# Patient Record
Sex: FEMALE | Race: WHITE | NOT HISPANIC OR LATINO | Employment: FULL TIME | URBAN - METROPOLITAN AREA
[De-identification: names, ages, dates, MRNs, and addresses within clinical notes are randomized per-mention and may not be internally consistent; named-entity substitution may affect disease eponyms.]

---

## 2022-11-29 ENCOUNTER — OFFICE VISIT (OUTPATIENT)
Dept: NEUROLOGY | Facility: CLINIC | Age: 62
End: 2022-11-29

## 2022-11-29 VITALS
SYSTOLIC BLOOD PRESSURE: 136 MMHG | HEART RATE: 81 BPM | DIASTOLIC BLOOD PRESSURE: 80 MMHG | OXYGEN SATURATION: 97 % | BODY MASS INDEX: 37.63 KG/M2 | WEIGHT: 268.8 LBS | HEIGHT: 71 IN

## 2022-11-29 DIAGNOSIS — R41.89 BRAIN FOG: Primary | ICD-10-CM

## 2022-11-29 PROCEDURE — 99204 OFFICE O/P NEW MOD 45 MIN: CPT | Performed by: PSYCHIATRY & NEUROLOGY

## 2022-11-29 NOTE — PROGRESS NOTES
"Notes by MA:  Patient presents to day for \"brain fog\" since having COVID around March of 2021.      Subjective:   62-year-old woman complaining of memory loss and brain fog since COVID infection in September 2020.  This is because difficulty at work and with projects outside of work.  She feels like she is lost her focus and to some degree her initiative.  She has tinnitus that she had previously but feels like it worsened after COVID.  She says that her hair thinned quite a bit after COVID but is started to come back slowly.  She says she snores.  However, she reports feeling rested in the mornings.  Patient ID: Chetna Good is a 62 y.o. female.    History of Present Illness  The following portions of the patient's history were reviewed and updated as appropriate: allergies, current medications, past family history, past medical history, past social history, past surgical history and problem list.    Review of Systems   Constitutional: Positive for appetite change (increased). Negative for activity change and fatigue.   HENT: Positive for tinnitus. Negative for facial swelling, trouble swallowing and voice change.    Eyes: Negative for photophobia, pain and visual disturbance.   Respiratory: Negative for chest tightness, shortness of breath and wheezing.    Cardiovascular: Negative for chest pain, palpitations and leg swelling.   Gastrointestinal: Negative for abdominal pain, nausea and vomiting.   Endocrine: Negative for cold intolerance and heat intolerance.   Musculoskeletal: Positive for joint swelling, neck pain and neck stiffness. Negative for arthralgias, back pain, gait problem and myalgias.   Neurological: Positive for numbness (toes). Negative for dizziness, tremors, seizures, syncope, facial asymmetry, speech difficulty, weakness, light-headedness and headaches.   Hematological: Does not bruise/bleed easily.   Psychiatric/Behavioral: Positive for decreased concentration. Negative for agitation, " behavioral problems, confusion, dysphoric mood, hallucinations, self-injury, sleep disturbance and suicidal ideas. The patient is not nervous/anxious and is not hyperactive.         Objective:    Neurologic Exam  Awake alert pleasant cooperative with fluent speech and normal speech comprehension.  Multiple cognitive screens were performed.  Please see accompanying data.  Her Folstein score was 27 with deficits primarily in recall.  Her animal fluency score was excellent at 28 and her clock drawing is normal at 4 out of 4.    Cranial nerves II through XII normal and symmetric.    The motor exam reveals normal and symmetric tone bulk and power with no drift and no spasticity.    Sensory exam reveals symmetric sensation to light touch, temperature, vibration.    Tendon reflexes are 1+ and symmetric throughout including ankle jerks.  Toes are downgoing.  No ankle clonus.    Walks in a narrow base with good stride length.  No parkinsonian features.  Physical Exam  Obese.  Neck is supple.  No cervical bruits.  Pulse is regular.  No extremity edema.  Assessment/Plan:     Diagnoses and all orders for this visit:    1. Brain fog (Primary)  -     Vitamin B12; Future  -     Folate; Future  -     Methylmalonic Acid, Serum; Future  -     TSH+Free T4  -     MRI Brain Without Contrast; Future     62-year-old woman with complaints of brain fog after COVID infection in September 2020.  Although there may have been a gradual improvement, she is still suffering from memory and cognitive issues that appear to be greater than expected for simple age-related memory loss.  Therefore we are moving ahead with work-up for reversible or modifiable causes to include metabolic work-up and brain imaging.  Per her request, we are arranging her brain MRI at [facility].  If the studies are unrevealing, it may be worthwhile to move on to a sleep study at that point given her body habitus and snoring, as another avenue to help with cognitive  dysfunction.  From a treatment standpoint, she has already been on a trial of steroids (for another reason) without seeing significant improvement.  Depending on her work-up a trial of Provigil or Nuvigil may be reasonable.  I discussed all the above with her in detail and answered her questions and I will review her work-up as it evolves.  Otherwise, we will see her back in 3 months time.

## 2022-12-02 ENCOUNTER — TELEPHONE (OUTPATIENT)
Dept: NEUROLOGY | Facility: CLINIC | Age: 62
End: 2022-12-02

## 2022-12-02 NOTE — TELEPHONE ENCOUNTER
Caller: BURKE     Best call back number:[phone]    What was the call regarding: I'M SO SORRY TO PUT THIS ON YOU ALL. I CALLED MY INS. YESTERDAY TO FIND OUT WHO HAS CONTRACT WITH MY INS SO I CAN SEE WHO OFFERS THE BEST COST/ DEDUCTIBLE FOR ([id number] MRI)  I WAS TOLD THEY CAN'T GIVE ME THAT INFORMATION, MY PROVIDER WOULD HAVE TO CALL. SO IF POSSIBLE CAN YOU LET ME KNOW WHAT  YOU FIND OUT.   ALSO I TOLD DR RUIZ IT WOULD PROBABLY BE IN JAN BEFORE I WOULD SCHED., THE ORDER FOR MRI  WAS ONLY GOOD UNTIL DEC. 30.22    IF THE COST IS  $2800.00 (MY DEDUCTIBLE)  I  CAN'T AFFORD THAT.     ALSO HE MENTION TWO DRUGS HE THOUGHT HE MAY SUGGEST FOR ME  DEPENDING RESULTS OF LABS ECT. CAN YOU SENT ME THE NAMES OF THEM SO I CAN LOOK UP.     Do you require a callback: YES IF YOU GET MY V.M YOU CAN LEAVE ME A DETAILED MESS. IM SORRY YOU HAVE TO DO THIS. BUT I HAVE EVEN TRIED   ON LINE AND GOT NOWHRE.     THANK YOU ALL FOR ALL YOUR HELP AGAIN HATE YOU HAVE TO DO INSURANCE  PART.

## 2022-12-09 ENCOUNTER — TELEPHONE (OUTPATIENT)
Dept: NEUROLOGY | Facility: CLINIC | Age: 62
End: 2022-12-09

## 2022-12-09 ENCOUNTER — PATIENT ROUNDING (BHMG ONLY) (OUTPATIENT)
Dept: NEUROLOGY | Facility: CLINIC | Age: 62
End: 2022-12-09

## 2022-12-09 ENCOUNTER — LAB (OUTPATIENT)
Dept: LAB | Facility: HOSPITAL | Age: 62
End: 2022-12-09

## 2022-12-09 DIAGNOSIS — R41.89 BRAIN FOG: ICD-10-CM

## 2022-12-09 LAB
FOLATE SERPL-MCNC: 19.3 NG/ML (ref 4.78–24.2)
T4 FREE SERPL-MCNC: 0.96 NG/DL (ref 0.93–1.7)
TSH SERPL DL<=0.05 MIU/L-ACNC: 2.45 UIU/ML (ref 0.27–4.2)
VIT B12 BLD-MCNC: 625 PG/ML (ref 211–946)

## 2022-12-09 PROCEDURE — 82746 ASSAY OF FOLIC ACID SERUM: CPT

## 2022-12-09 PROCEDURE — 36415 COLL VENOUS BLD VENIPUNCTURE: CPT

## 2022-12-09 PROCEDURE — 83921 ORGANIC ACID SINGLE QUANT: CPT

## 2022-12-09 PROCEDURE — 84439 ASSAY OF FREE THYROXINE: CPT | Performed by: PSYCHIATRY & NEUROLOGY

## 2022-12-09 PROCEDURE — 82607 VITAMIN B-12: CPT

## 2022-12-09 PROCEDURE — 84443 ASSAY THYROID STIM HORMONE: CPT | Performed by: PSYCHIATRY & NEUROLOGY

## 2022-12-09 NOTE — TELEPHONE ENCOUNTER
----- Message from Jayson Redman MD sent at 12/9/2022 10:00 AM EST -----  Vitamin B12 and thyroid panels look great.  No changes.

## 2022-12-13 ENCOUNTER — TELEPHONE (OUTPATIENT)
Dept: NEUROLOGY | Facility: CLINIC | Age: 62
End: 2022-12-13

## 2022-12-13 NOTE — TELEPHONE ENCOUNTER
MRI order has and prior authorization information, has been faxed over to [employer]. Also sent a [employer] message to make patient aware as well.

## 2022-12-13 NOTE — TELEPHONE ENCOUNTER
ZACH   I FOUND A MRI FACILITY  WAS TOLD SEVERAL OF OUR NEURO PT GO [facility]     [facility]   FAX # [phone]     WAS TOLD THEY CAN GET ME IN Monday IF THEY GET APPROVED ORDER .    THANKS BURKE

## 2022-12-15 LAB — METHYLMALONATE SERPL-SCNC: 160 NMOL/L (ref 0–378)

## 2023-01-10 ENCOUNTER — TELEPHONE (OUTPATIENT)
Dept: NEUROLOGY | Facility: CLINIC | Age: 63
End: 2023-01-10
Payer: COMMERCIAL

## 2023-01-10 NOTE — TELEPHONE ENCOUNTER
She would like to know the results from her MRI, and Dr Redman was going to go over some medications

## 2023-01-12 NOTE — TELEPHONE ENCOUNTER
Informed pt of MRI results. Pt verbalized understanding.     Per your note, if tests were unrevealing, may pursue sleep study and then add a medication, either provigil or nuvigil. Pt would like to proceed with a sleep study and medication.

## 2023-01-13 DIAGNOSIS — R41.89 BRAIN FOG: Primary | ICD-10-CM

## 2023-01-13 NOTE — TELEPHONE ENCOUNTER
Left detailed message, per request of pt.     Sleep study ordered - pt will need to request in home study    Medications cannot be ordered until after results of sleep study.    Call if any questions.

## 2023-03-10 ENCOUNTER — TELEPHONE (OUTPATIENT)
Dept: NEUROLOGY | Facility: CLINIC | Age: 63
End: 2023-03-10
Payer: COMMERCIAL

## 2023-03-10 NOTE — TELEPHONE ENCOUNTER
03/10/2023 Lm to call and reschedule her appointment on 06/09/2023 @ 8 am to another day, Dr Redman will be out of the office that week  LAB

## 2023-04-04 ENCOUNTER — TELEPHONE (OUTPATIENT)
Dept: NEUROLOGY | Facility: CLINIC | Age: 63
End: 2023-04-04
Payer: COMMERCIAL

## 2023-04-04 NOTE — TELEPHONE ENCOUNTER
04/04/2023 I spoke to Maury with [employer], he is going to send this back to be filed with the Insurance Co, I told him that I have already spoke to Tyra with [employer] and she says they will not retro any any claims, he says to give it 30 to 45 business days , he says patient should not be billed while this is being sent to the Insurance company, I called Chetna to give her this information, Billing # is [id number] for [employer]

## 2023-04-04 NOTE — TELEPHONE ENCOUNTER
2023 received a call from Chetna saying she received a bill for her MRI from [facility], I looked up the PA and The PA  on 2022, she says she has paperwork at home that says I had the PA extended to  and moved the facility to [facility] from [facility], She said she spoke to the facility and they told her that I Had to call the insurance Co. And have the PA amended so that the insurance co will pay for the MRI

## 2023-04-25 ENCOUNTER — OFFICE VISIT (OUTPATIENT)
Dept: SLEEP MEDICINE | Facility: HOSPITAL | Age: 63
End: 2023-04-25
Payer: COMMERCIAL

## 2023-04-25 ENCOUNTER — TELEPHONE (OUTPATIENT)
Dept: NEUROLOGY | Facility: CLINIC | Age: 63
End: 2023-04-25

## 2023-04-25 VITALS
HEIGHT: 71 IN | HEART RATE: 78 BPM | WEIGHT: 269.6 LBS | SYSTOLIC BLOOD PRESSURE: 178 MMHG | BODY MASS INDEX: 37.74 KG/M2 | DIASTOLIC BLOOD PRESSURE: 92 MMHG | OXYGEN SATURATION: 98 %

## 2023-04-25 DIAGNOSIS — G47.10 HYPERSOMNOLENCE: ICD-10-CM

## 2023-04-25 DIAGNOSIS — G47.33 OBSTRUCTIVE SLEEP APNEA: ICD-10-CM

## 2023-04-25 DIAGNOSIS — E66.9 OBESITY (BMI 30-39.9): ICD-10-CM

## 2023-04-25 DIAGNOSIS — R41.840 LACK OF CONCENTRATION: Primary | ICD-10-CM

## 2023-04-25 PROCEDURE — G0463 HOSPITAL OUTPT CLINIC VISIT: HCPCS

## 2023-04-25 NOTE — TELEPHONE ENCOUNTER
PT CALLED IN TO CANCEL APPT - WILL NEED TO BE R/S FOR AFTER SLEEP STUDY RESULTS ARE RECEIVED    THANK YOU

## 2023-04-25 NOTE — PROGRESS NOTES
"[facility] SLEEP MEDICINE  [address]  [address]  [place] [address] [phone]    Referring Physician: Dr. Rodriguez  PCP: Ehsan Bunch MD    Reason for consult:  Sleep disorders of brain fog after Covid    Chetna Good is a 62 y.o.female was seen in the Sleep Disorders Center today. She sleeps from 10pm to 6am. Recent wt gain 30 lbs. She wakes up feeling rested. Around 3 pm she tends to feel sleepy. Lives by herself.  Sylva Sleepiness Score: 5. Caffeine intake 1 per day. Alcohol intake 6 per week.    Chetna Good  has a past medical history of Bell palsy, Difficulty walking, Memory loss, and Shingles.     Current Medications:    Current Outpatient Medications:   •  azithromycin (ZITHROMAX) 250 MG tablet, 2 tablets first day, then 1 daily for 4 days, Disp: 6 tablet, Rfl: 0  •  MAGNESIUM PO, Take  by mouth., Disp: , Rfl:   •  predniSONE (DELTASONE) 20 MG tablet, Take 2 tablets by mouth Daily for 5 days., Disp: 10 tablet, Rfl: 0  •  VITAMIN D PO, Take  by mouth., Disp: , Rfl:    also listed in Sleep Questionnaire.    FH: family history is not on file.  SH:  reports that she has quit smoking. Her smoking use included cigarettes. She has never used smokeless tobacco. Alcohol use questions deferred to the physician. Drug use questions deferred to the physician.    Pertinent Positive Review of Systems of recurrent nose bleed, painful joints, swelling feet,depression  Rest of Review of Systems was negative as recorded in Sleep Questionnaire.        Vital Signs: /92   Pulse 78   Ht 180.3 cm (71\")   Wt 122 kg (269 lb 9.6 oz)   SpO2 98%   BMI 37.60 kg/m²     Body mass index is 37.6 kg/m².       Tongue: Large       Dentition: good       Pharynx: Posterior pharyngeal pillars are wide   Mallampatti: II (hard and soft palate, upper portion of tonsils anduvula visible)        General: Alert. Cooperative. Well developed. No acute distress.             Head:  Normocephalic. " Symmetrical. Atraumatic.              Eyes: Sclera clear. No icterus. PERRLA. Normal EOM.             Ears: No deformities. Normal hearing.             Nose: No septal deviation. No drainage.          Throat: No oral lesions. No thrush. Moist mucous membranes.    Chest Wall:  Normal shape. Symmetric expansion with respiration. No tenderness.             Neck:  Trachea midline.           Lungs:  Clear to auscultation bilaterally. No wheezes. No rhonchi. No rales. Respirations regular, even and unlabored.            Heart:  Regular rhythm and normal rate. Normal S1 and S2. No murmur.     Abdomen:  Soft, non-tender and non-distended. Normal bowel sounds. No masses.  Extremities:  Moves all extremities well. No edema.           Pulses: Pulses palpable and equal bilaterally.               Skin: Dry. Intact. No bleeding. No rash.           Neuro: Moves all 4 extremities and cranial nerves grossly intact.  Psychiatric: Normal mood and affect.    Impression:  No diagnosis found.      No orders of the defined types were placed in this encounter.           Plan:  Chetna reports brain fog and inability to concentrate as well as prior to COVID.  Multiple etiologies are possible including sleep disordered breathing.  Other etiologies such as nocturnal seizures, abnormal movements during sleep etc. would require in lab polysomnogram study.  I therefore offered her a in lab PSG.  She however prefers to start with a HST.  She is aware that this would only rule out sleep disordered breathing.  The same was scheduled.    This patient has typical features of obstructive sleep apnea with hypersomnolence snoring and apneas along with elevated BMI and classic oropharyngeal structure.  Likelihood of obstructive sleep apnea is high and a polysomnogram study will therefore be requested.      Possible diagnosis and pathophysiology of obstructive sleep apnea was discussed with the patient.  Health risks of untreated obstructive sleep apnea  including cardiovascular risks were discussed.  Patient was cautioned about activities requiring full concentration especially driving at night or for longer distances, and until hypersomnolence is corrected.    Results of sleep testing will be reviewed to see if patient is a candidate for CPAP machine.  Alternatives to therapy include oral mandibular advancing device (OMAD) were discussed. However OMAD is usually only beneficial in mild obstructive sleep apnea.  The benefit of weight loss in reducing severity of obstructive sleep apnea was discussed.  Patient would benefit from adhering to a strict diet to achieve ideal BMI.     Patient will follow up in this clinic after testing.    Thank you for allowing me to participate in your patient's care.    Electronically signed by Kushal Holland MD, 04/25/23, 8:26 AM EDT.    Part of this note may be an electronic transcription/translation of spoken language to printed text using the Dragon Dictation System.

## 2023-05-12 ENCOUNTER — HOSPITAL ENCOUNTER (OUTPATIENT)
Dept: SLEEP MEDICINE | Facility: HOSPITAL | Age: 63
Discharge: HOME | End: 2023-05-12
Admitting: INTERNAL MEDICINE
Payer: COMMERCIAL

## 2023-05-12 DIAGNOSIS — G47.33 OBSTRUCTIVE SLEEP APNEA: ICD-10-CM

## 2023-05-12 PROCEDURE — 95806 SLEEP STUDY UNATT&RESP EFFT: CPT

## 2023-06-01 ENCOUNTER — TELEPHONE (OUTPATIENT)
Dept: NEUROLOGY | Facility: CLINIC | Age: 63
End: 2023-06-01

## 2023-06-01 NOTE — TELEPHONE ENCOUNTER
Received call from pt calling on behalf of herself from the hub. Pt states that her sleep study results are in. She wants to know if she will hear the results from the sleep  or from Dr Redman and if he needs to see her for a follow up. She states that Dr Redman sent her for the sleep study. I told her that I wasn't really sure, but that she would probably hear from someone from our office when the results have been reviewed and Dr. Redman would decide if he needed to see her for a follow up visit. Pt states that she is unsure the best way to communicate with our office that she just figured out My Chart yesterday. I advised the pt that communicating through My Chart would be the best option.

## 2023-06-06 ENCOUNTER — TELEPHONE (OUTPATIENT)
Dept: SLEEP MEDICINE | Facility: HOSPITAL | Age: 63
End: 2023-06-06
Payer: COMMERCIAL

## 2023-06-06 NOTE — TELEPHONE ENCOUNTER
Went over results with pt. Sent message to  for orders. Pt would like orders sent to [facility]. Scheduled f/u appt to further discuss.

## 2023-06-16 ENCOUNTER — OFFICE VISIT (OUTPATIENT)
Dept: SLEEP MEDICINE | Facility: HOSPITAL | Age: 63
End: 2023-06-16
Payer: COMMERCIAL

## 2023-06-16 ENCOUNTER — TELEPHONE (OUTPATIENT)
Dept: SLEEP MEDICINE | Facility: HOSPITAL | Age: 63
End: 2023-06-16
Payer: COMMERCIAL

## 2023-06-16 VITALS
WEIGHT: 265 LBS | BODY MASS INDEX: 37.1 KG/M2 | DIASTOLIC BLOOD PRESSURE: 78 MMHG | HEIGHT: 71 IN | HEART RATE: 73 BPM | SYSTOLIC BLOOD PRESSURE: 133 MMHG | OXYGEN SATURATION: 97 %

## 2023-06-16 DIAGNOSIS — E66.9 OBESITY (BMI 30-39.9): ICD-10-CM

## 2023-06-16 DIAGNOSIS — G47.33 OBSTRUCTIVE SLEEP APNEA: Primary | ICD-10-CM

## 2023-06-16 PROCEDURE — G0463 HOSPITAL OUTPT CLINIC VISIT: HCPCS

## 2023-06-16 NOTE — PROGRESS NOTES
"[facility] SLEEP MEDICINE  [address]  [place] [address] [phone]    PCP: Ehsan Bunch MD    Reason for visit:  Sleep disorders: LUIS    Chetna is a 62 y.o.female who was seen in the Sleep Disorders Center today. She is here to discuss results of sleep study. She sleeps from 11am from 3am.  [place] Sleepiness Scale is 5. Caffeine 5 per day. Alcohol 4 per week.    Chetna  reports that she has quit smoking. Her smoking use included cigarettes. She has never used smokeless tobacco.    Pertinent Positive Review of Systems of nasal congestion, pnd  Rest of Review of Systems was negative as recorded in Sleep Questionnaire.    Patient  has a past medical history of Bell palsy, Difficulty walking, Memory loss, and Shingles.     Current Medications:    Current Outpatient Medications:     azithromycin (ZITHROMAX) 250 MG tablet, 2 tablets first day, then 1 daily for 4 days, Disp: 6 tablet, Rfl: 0    MAGNESIUM PO, Take  by mouth., Disp: , Rfl:     VITAMIN D PO, Take  by mouth., Disp: , Rfl:    also entered in Sleep Questionnaire         Vital Signs: /78   Pulse 73   Ht 180.3 cm (71\")   Wt 120 kg (265 lb)   SpO2 97%   BMI 36.96 kg/m²     Body mass index is 36.96 kg/m².       Tongue: Large       Dentition: good       Pharynx: Posterior pharyngeal pillars are wide   Mallampatti: III (soft and hard palate and base of uvula visible)        General: Alert. Cooperative. Well developed. No acute distress.             Head:  Normocephalic. Symmetrical. Atraumatic.              Nose: No septal deviation. No drainage.          Throat: No oral lesions. No thrush. Moist mucous membranes.    Chest Wall:  Normal shape. Symmetric expansion with respiration. No tenderness.             Neck:  Trachea midline.           Lungs:  Clear to auscultation bilaterally. No wheezes. No rhonchi. No rales. Respirations regular, even and unlabored.            Heart:  Regular rhythm and normal rate. Normal S1 " and S2. No murmur.     Abdomen:  Soft, non-tender and non-distended. Normal bowel sounds. No masses.  Extremities:  Moves all extremities well. No edema.    Psychiatric: Normal mood and affect.    Diagnostic data available to date is as below and was reviewed on current visit:  5/17/23: The patient tolerated the home sleep testing with monitoring time of 524 minutes. The data obtained make this a technically adequate study. The apnea hypopneas index(AHI) was 39.9 per sleep hour. The AHI during supine position was 39.9 per sleep hour. Mean heart rate of 70.3 BPM. Snoring was noted 71.2% of sleep time. Lowest oxygen saturation during the study was 77%. Saturation below 89% was noted for 121.3 mins.       No orders of the defined types were placed in this encounter.         Impression:  1. Obstructive sleep apnea    2. Obesity (BMI 30-39.9)        Plan:  Chetna has severe obstructive sleep apnea and requires treatment with a CPAP device.  We discussed study results today.  She will be setup device auto 5-15 and requires URIEL on CPAP.  Please note patient called back to states she would like set up with [facility] and not [facility].    Chetna has severe sleep apnea.  I discussed results with her and explained the cardiovascular health associations besides other health problems associated with significant sleep disordered breathing.  We discussed various modalities of treatment and went through the pros and cons of each. Oral mandibular advancing device and possible use of an INSPIRE device was discussed.   In her case I would recommend treatment with a positive airway pressure device.      Positive airway pressure devices are most effective management strategy. Various mask interfaces were discussed.  I explained that the most important factor in compliance is a comfortable mask and the profile of the mask on the face (full face / nasal etc.) eventually makes little difference. Chetna has been fitted for a mask in the sleep center  and will trial that first.  I reminded her that the device mask can be changed within the first month as many times as she likes.  Thereafter it can only be changed every 3 months through insurance.      The pressure on the positive airway pressure machine can feel overwhelming at first.  If this is the case Chetna could use the machine while awake, such as while watching television, in order to get used.  However the face acclimatize to the pressure within a few weeks and thereafter the pressures are far less noticeable.  I explained to her the necessity of breathing through the nose and not through the mouth.      Compliance requirements of insurance were discussed and especially the fact that lack of compliance within the first 90 days of at least 4 hours usage nightly, may result in repossession of the positive airway pressure device by the durable medical equipment company. [place]  will then have to have a repeat sleep study prior to getting a new device.    I have prescribed a new device to her preferred durable medical equipment company and will see Chetna back for a compliance check.     Patient will follow up in this clinic in 2 months     Thank you for allowing me to participate in your patient's care.    Electronically signed by Kushal Holland MD, 06/16/23, 12:25 PM EDT.    Part of this note may be an electronic transcription/translation of spoken language to printed text using the Dragon Dictation System.

## 2023-06-16 NOTE — TELEPHONE ENCOUNTER
Patient called back and has asked to have order sent to [facility].

## 2023-07-24 ENCOUNTER — TELEPHONE (OUTPATIENT)
Dept: SLEEP MEDICINE | Facility: HOSPITAL | Age: 63
End: 2023-07-24
Payer: COMMERCIAL

## 2023-07-24 NOTE — TELEPHONE ENCOUNTER
Spoke with patient about CPAP pressures, she would like them lowered , changed via modem 5-7 per Angela DIEGO

## 2023-08-11 ENCOUNTER — DOCUMENTATION (OUTPATIENT)
Dept: SLEEP MEDICINE | Facility: HOSPITAL | Age: 63
End: 2023-08-11
Payer: COMMERCIAL

## 2023-08-11 NOTE — PROGRESS NOTES
Ovn oximetry on CPAP RA shows desaturation to <88% for 25 minutes 22 seconds. Pt is on auto CPAP 5-7cm H2O. I recommended higher pressures. She may need in lab titration study if agrees. I asked staff to reach out to the patient.

## 2023-08-14 ENCOUNTER — TELEPHONE (OUTPATIENT)
Dept: SLEEP MEDICINE | Facility: HOSPITAL | Age: 63
End: 2023-08-14
Payer: COMMERCIAL

## 2023-08-14 NOTE — TELEPHONE ENCOUNTER
Spoke with patient about overnight oximetry and drs recommendations to increase pressure or do titration, pt declined the titration at this point, she will get a new mask , and ok with pressure increase 5-10 done via modem. She is willing to repeat overnight oximetry once she gets her new mask from DME with current pressure change    multiple small ecchymotic areas noted on arms/hands

## 2023-09-25 ENCOUNTER — DOCUMENTATION (OUTPATIENT)
Dept: SLEEP MEDICINE | Facility: HOSPITAL | Age: 63
End: 2023-09-25

## 2023-09-25 NOTE — PROGRESS NOTES
Updated ovn oximetry on CPAP RA 5-10cm H2O 9/11/23 continues to demonstrate desaturations. Sats <=88% 27 min and 4 seconds. Patient previously declined titration study. I asked staff to raise CPAP to 5-15cm H2O and check with DME if [facility] will approve O2 with sleep without doing in lab titration. Staff was asked to inform pt.

## 2023-09-26 ENCOUNTER — TELEPHONE (OUTPATIENT)
Dept: SLEEP MEDICINE | Facility: HOSPITAL | Age: 63
End: 2023-09-26
Payer: COMMERCIAL

## 2023-09-29 ENCOUNTER — TELEPHONE (OUTPATIENT)
Dept: SLEEP MEDICINE | Facility: HOSPITAL | Age: 63
End: 2023-09-29
Payer: COMMERCIAL

## 2023-09-29 NOTE — TELEPHONE ENCOUNTER
Patient called stating that her weight is incorrect in her sleep study report and wanted the dr to know it was incorrect to see if it could cause a miss diagnosis . She also stated the report said she slept supine all night , she stated she did not sleep on her back . Will message Dr with new information and wait for reply and get corrected report with weight

## 2023-10-03 ENCOUNTER — TELEPHONE (OUTPATIENT)
Dept: SLEEP MEDICINE | Facility: HOSPITAL | Age: 63
End: 2023-10-03
Payer: COMMERCIAL

## 2023-10-09 ENCOUNTER — TELEPHONE (OUTPATIENT)
Dept: SLEEP MEDICINE | Facility: HOSPITAL | Age: 63
End: 2023-10-09
Payer: COMMERCIAL

## 2023-10-12 ENCOUNTER — DOCUMENTATION (OUTPATIENT)
Dept: SLEEP MEDICINE | Facility: HOSPITAL | Age: 63
End: 2023-10-12
Payer: COMMERCIAL

## 2023-10-12 NOTE — PROGRESS NOTES
I spoke to patient.  Clarified issues regarding incorrect weight information entered on HST report.  Most recent download ending 10/1/2023 reviewed.  Average CPAP pressure is 6 to 7 cm with AHI 3.6.  Current setting is auto CPAP 5-15 on this download.  Patient states she has been tolerating her CPAP better in the last few days and weeks.  She feels she is getting used to the machine.  Document patient regarding overnight oximetry reviewed with the patient.  Sats below 89 % for 27 minutes and 4 seconds.  This was done on 9/11/2023.  In light of better compliance with CPAP machine I will order a repeat overnight oximetry on current CPAP settings.    Patient needs follow-up appointment in the office and message sent to front office to schedule same.    Kushal Holland MD  10/12/23  19:06 EDT

## 2023-10-18 ENCOUNTER — TELEPHONE (OUTPATIENT)
Dept: SLEEP MEDICINE | Facility: HOSPITAL | Age: 63
End: 2023-10-18
Payer: COMMERCIAL

## 2023-10-18 NOTE — TELEPHONE ENCOUNTER
----- Message from Kushal Holland MD sent at 10/6/2023  8:43 PM EDT -----  Regarding: RE: pressure to high  Change back to Auto 5-10 and do repeat URIEL on same.  ----- Message -----  From: Libra June  Sent: 10/4/2023   8:06 AM EDT  To: Kushal Holland MD, Lisa Wilkins, APRN  Subject: pressure to high                                 Sorry it was Florentino Basilio .She called yesterday about her pressure being to high and rhea she needs it lower.

## 2023-10-18 NOTE — TELEPHONE ENCOUNTER
----- Message from Kushal Holland MD sent at 10/12/2023  2:42 PM EDT -----  Regardinnd opinion  Looks like this patient wants 2nd opinion.    Please schedule her with one of the [facility] sleep doctors and discontinue fu with us.    Thanks,      Dr. Holland

## 2023-10-20 ENCOUNTER — TELEPHONE (OUTPATIENT)
Dept: SLEEP MEDICINE | Facility: HOSPITAL | Age: 63
End: 2023-10-20
Payer: COMMERCIAL

## 2023-10-20 DIAGNOSIS — G47.33 OBSTRUCTIVE SLEEP APNEA: Primary | ICD-10-CM

## 2023-11-15 NOTE — PROGRESS NOTES
Chief Complaint  Sleep Apnea    Subjective          Chetna Good presents to [facility] NEUROLOGY  History of Present Illness    Patient states she has a Cpap machine.  Hst suggested severe emily by hst, but there is some concern it was not accurate, due to a variety of reasons  O2 trend has been done on several occasions   aug 2 4% index of 4 per hour and on sept 23 index of  13 per hour  with the machine    She uses the nasal pillows, she gets supplies through [employer].  She is concerned that her sleep study was not correct due to her weight was wrong on the study.    Pt presented with brain fog. Which did not go away after two years  Pt referred to rule out sleep apnea,     Pt tried CPAP but feels it made her feel much worse than ever.     The patient c/o daytime sleepiness issues:   No .      The patient complains of snoring  no .    The patient complains of Leg symptoms: Occasionally, discomfort on a creepy crawly feeling in legs when resting or relaxing and this may partially or completely improved by stretching or moving. Occasionally when watching tv     The patient complains of problems with insomnia:   no .    Sleep schedule: Bedtime:11pm , gets out of bed at 6:30am, sleep latency: 15-20 mins, Gets about 6-7 hours of sleep.    EPWORTH SLEEPINESS SCALE  Sitting and reading 2 WatchingTV 0  Sitting, inactive, in a public place 0  As a passenger in a car for 1 hour w/o a break  0  Lying down to rest in the afternoon  0  Sitting and talking to someone  0  Sitting quietly after a lunch  0  In a car, while stopped for traffic or a light  0  Total 2    Download, on 11/15/ 2023  Ahi 2.7, avg use 4 hours  avg pressure 5.5 per hour  leak 44sec     Review of Systems   HENT:  Positive for drooling.    Respiratory:  Positive for shortness of breath.    Gastrointestinal:  Positive for anal bleeding.   Genitourinary:  Positive for vaginal bleeding.   Musculoskeletal:  Positive for back pain, gait  "problem and neck stiffness.   Psychiatric/Behavioral:  Positive for decreased concentration and sleep disturbance.    All other systems reviewed and are negative.  There is no history of hypnagogic hallucinations, sleep paralysis or cataplexy.    Objective   Vital Signs:   /86   Pulse 84   Ht 180.3 cm (71\")   Wt 120 kg (264 lb)   BMI 36.82 kg/m²     Physical Exam  Vitals reviewed.   HENT:      Head: Normocephalic.   Eyes:      Conjunctiva/sclera: Conjunctivae normal.      Pupils: Pupils are equal, round, and reactive to light.   Cardiovascular:      Rate and Rhythm: Normal rate.   Pulmonary:      Effort: Pulmonary effort is normal. No respiratory distress.   Neurological:      General: No focal deficit present.      Mental Status: She is alert and oriented to person, place, and time.      Result Review :                 Assessment and Plan    There are no diagnoses linked to this encounter.    Follow Up   Return for Follow Up visit 30 to 90 days after obtaining PAP.  Patient was given instructions and counseling regarding her condition or for health maintenance advice. Please see specific information pulled into the AVS if appropriate.       This document has been electronically signed by Joseph Seipel, MD on November 16, 2023 15:06 EST  "

## 2023-11-16 ENCOUNTER — OFFICE VISIT (OUTPATIENT)
Dept: NEUROLOGY | Facility: CLINIC | Age: 63
End: 2023-11-16
Payer: COMMERCIAL

## 2023-11-16 VITALS
HEART RATE: 84 BPM | SYSTOLIC BLOOD PRESSURE: 150 MMHG | HEIGHT: 71 IN | WEIGHT: 264 LBS | BODY MASS INDEX: 36.96 KG/M2 | DIASTOLIC BLOOD PRESSURE: 86 MMHG

## 2023-11-16 DIAGNOSIS — G47.33 OSA (OBSTRUCTIVE SLEEP APNEA): Primary | ICD-10-CM

## 2023-11-16 PROBLEM — E66.9 OBESITY: Status: ACTIVE | Noted: 2021-02-09

## 2023-11-16 PROBLEM — M54.9 CHRONIC BACK PAIN: Status: ACTIVE | Noted: 2017-11-08

## 2023-11-16 PROBLEM — E78.5 HYPERLIPIDEMIA: Status: ACTIVE | Noted: 2021-02-09

## 2023-11-16 PROBLEM — G89.29 CHRONIC BACK PAIN: Status: ACTIVE | Noted: 2017-11-08

## 2023-11-16 RX ORDER — PHENDIMETRAZINE TARTRATE 35 MG/1
TABLET ORAL
COMMUNITY

## 2023-11-16 RX ORDER — VIT C/B6/B5/MAGNESIUM/HERB 173 50-5-6-5MG
CAPSULE ORAL
COMMUNITY

## 2023-11-16 RX ORDER — ZOLPIDEM TARTRATE 5 MG/1
TABLET ORAL
Qty: 2 TABLET | Refills: 0 | Status: SHIPPED | OUTPATIENT
Start: 2023-11-16 | End: 2023-11-17

## 2023-11-16 RX ORDER — ASCORBIC ACID 125 MG
TABLET,CHEWABLE ORAL
COMMUNITY

## 2023-11-17 ENCOUNTER — OFFICE VISIT (OUTPATIENT)
Dept: INTERNAL MEDICINE | Facility: CLINIC | Age: 63
End: 2023-11-17
Payer: COMMERCIAL

## 2023-11-17 VITALS
OXYGEN SATURATION: 98 % | HEIGHT: 71 IN | WEIGHT: 263 LBS | HEART RATE: 87 BPM | SYSTOLIC BLOOD PRESSURE: 138 MMHG | BODY MASS INDEX: 36.82 KG/M2 | DIASTOLIC BLOOD PRESSURE: 86 MMHG

## 2023-11-17 DIAGNOSIS — Z11.3 ROUTINE SCREENING FOR STI (SEXUALLY TRANSMITTED INFECTION): ICD-10-CM

## 2023-11-17 DIAGNOSIS — Z12.4 SCREENING FOR CERVICAL CANCER: ICD-10-CM

## 2023-11-17 DIAGNOSIS — M21.371 RIGHT FOOT DROP: ICD-10-CM

## 2023-11-17 DIAGNOSIS — R41.89 BRAIN FOG: ICD-10-CM

## 2023-11-17 DIAGNOSIS — R20.0 NUMBNESS OF FOOT: ICD-10-CM

## 2023-11-17 DIAGNOSIS — R06.89 BREATHING DIFFICULTY: Primary | ICD-10-CM

## 2023-11-17 PROCEDURE — 99204 OFFICE O/P NEW MOD 45 MIN: CPT | Performed by: STUDENT IN AN ORGANIZED HEALTH CARE EDUCATION/TRAINING PROGRAM

## 2023-11-17 NOTE — PROGRESS NOTES
"  Steven Willson M.D.  Internal Medicine  [facility]  [address], [address]  [place] [address]  [phone]      Chief Complaint  Establish Care, spots (Possible age spots on body new /), Shortness of Breath (SOB since Covid ), numb toes (Both feet numb toes /), and foot drop (Right foot drops //)    SUBJECTIVE    History of Present Illness    Chetna Good is a 63 y.o. female who presents to the office today as a new patient to establish care.  She has multiple concerns today.    Brain fog since last October. Started when she had COVID.     She had sleep study which showed severe obstructive sleep apnea, but there is some concern it was not accurate, due to a variety of reasons. She has a Cpap machine. She is redoing her sleep study.     Feels she breaths too shallow for years. . She is always hunched over. Better if she sits up. Has to stop to yawn and get a deep breath in if she is walking in the park. Feels breath stop in her chest. No wheezing.  She is fairly active however activity limited due to joint pain.    Numb toes since about 2021. She does not have diabetes. Right foot \"slaps\" when she walks. 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  She saw Dr. Redman with neurology last year for brain fog. No tingling. Normal B12 and Folate last year ordered by neurology.  Neurology considered Provigil or Nuvigil trial to treat her symptoms.  Has sciatica on the left. No weakness.     Goes to weight loss center for Phendimetarazine. She is hungry all the time.     Spots on leg since 2020. Saw dermatology and was told these were \"age spots\". They do not itch.     She had a brain MRI last year that was normal.    Rides bike, goes the [employer] gym, walks three days a week. Works part time as  at the [employer].     Recently had left meniscal tear.    Reports she went through menopause in her 40s.  She notes occasional vaginal spotting that she attributes to having a rectocele.  She follows with " "urogynecology.  Denies any vaginal discharge or pruritus today.  She had her mammogram today.    Review of Systems    No Known Allergies     Outpatient Medications Marked as Taking for the 23 encounter (Office Visit) with Steven Willson MD   Medication Sig Dispense Refill    APPLE CIDER VINEGAR PO Take  by mouth.      BLACK CURRANT SEED OIL PO Take  by mouth.      CINNAMON PO Take 1,000 mg by mouth.      Cyanocobalamin (B-12) 5000 MCG capsule Take  by mouth.      MAGNESIUM PO Take  by mouth.      Phendimetrazine Tartrate 35 MG tablet Take  by mouth.      Turmeric 500 MG capsule Take  by mouth.      VITAMIN D PO Take  by mouth.      [DISCONTINUED] Semaglutide-Weight Management 0.25 MG/0.5ML solution auto-injector Inject 0.25 mg every week by subcutaneous route.          Past Medical History:   Diagnosis Date    Bell palsy     Difficulty walking     Memory loss     Shingles      Past Surgical History:   Procedure Laterality Date    BREAST SURGERY       SECTION      HERNIA REPAIR       Family History   Problem Relation Age of Onset    Breast cancer Mother     Arrhythmia Mother     Heart disease Father     Kidney cancer Sister         [facility]    reports that she quit smoking about 35 years ago. Her smoking use included cigarettes. She has never used smokeless tobacco. Alcohol use questions deferred to the physician. Drug use questions deferred to the physician.    OBJECTIVE    Vital Signs:   /86   Pulse 87   Ht 180.2 cm (70.95\")   Wt 119 kg (263 lb)   SpO2 98%   BMI 36.74 kg/m²     Physical Exam  Constitutional:       Appearance: Normal appearance. She is normal weight.   Cardiovascular:      Rate and Rhythm: Normal rate and regular rhythm.      Pulses:           Dorsalis pedis pulses are 2+ on the right side and 2+ on the left side.      Heart sounds: Normal heart sounds. No murmur heard.  Pulmonary:      Effort: Pulmonary effort is normal.      Breath sounds: Normal breath sounds.   Abdominal:      " General: Abdomen is flat. There is no distension.      Palpations: Abdomen is soft.      Tenderness: There is no abdominal tenderness.   Genitourinary:     Cervix: Normal.      Comments: Some varicosities of the labia.  She has a rectocele.  Musculoskeletal:      Comments: Left knee wrapped in Ace bandage.   Feet:      Right foot:      Protective Sensation: 7 sites tested.  6 sites sensed.      Skin integrity: Callus present.      Left foot:      Protective Sensation: 7 sites tested.  6 sites sensed.      Skin integrity: Callus present.   Skin:     General: Skin is warm and dry.      Comments: Raised waxy spots on extremities   Neurological:      Mental Status: She is alert.   Psychiatric:         Mood and Affect: Mood normal.         Behavior: Behavior normal.         Thought Content: Thought content normal.            The following data was reviewed by: Steven Willson MD on 11/17/2023:  CMP          11/17/2023    14:45   CMP   Glucose 111    BUN 20    Creatinine 0.99    Sodium 141    Potassium 4.4    Chloride 104    Calcium 10.0    BUN/Creatinine Ratio 20.2          TSH          12/9/2022    07:19   TSH   TSH 2.450      HgB          11/17/2023    14:45   HGB   Hemoglobin 14.9      Data reviewed : Recent neurology note              ASSESSMENT & PLAN     Diagnoses and all orders for this visit:    1. Breathing difficulty (Primary)  -     PFT / Spirometry - Order Using Outpatient Pulmonary Function Testing SmartSet  -     CBC w AUTO Differential  -     Basic Metabolic Panel    2. Routine screening for STI (sexually transmitted infection)  -     Chlamydia trachomatis, Neisseria gonorrhoeae, Trichomonas vaginalis, PCR - Swab, Vagina    3. Screening for cervical cancer  -     IGP, Rfx Aptima HPV ASCU    4. Brain fog    5. Numbness of foot    6. Right foot drop        64 yo with obesity here to establish care. She has multiple concerns today.     Concerned about multiple sebhorreic keratosis.  Reassured her that these are  benign and not related to COVID.  Discussed following up with her dermatologist if these become more bothersome. Discussed having many Seborrheic keratosis appear at the same time can be associated with colon cancer but she is up to date on screenings and skin lesions have been present for years.     For her foot drop and toe numbness she wants to  wait on EMG and NCS until she sees neurology. I offered PT referral for foot drop which she declined.     I encouraged her to follow up with sleep study for brain fog. Discussed brain fog from COVID is a diagnosis of exclusion and usually treated symptomatically.     Breathing issue does not appear to be significantly interfering with her life. Ordering PFTs to rule out lung disease or obesity hypoventilation. Some amount of sighing is normal and can be associated with anxiety. Her exercise tolerance has been good but now more limited due to musculoskeletal issues.       Health Maintenance Due   Topic Date Due    COLORECTAL CANCER SCREENING  Never done    TDAP/TD VACCINES (1 - Tdap) Never done    HEPATITIS C SCREENING  Never done    ANNUAL PHYSICAL  Never done    PAP SMEAR  Never done    INFLUENZA VACCINE  Never done    COVID-19 Vaccine (3 - 2023-24 season) 09/01/2023    LIPID PANEL  Never done        Follow Up  Return in about 6 months (around 5/17/2024) for Annual physical.    Patient/family had no further questions at this time and verbalized understanding of the plan discussed today.

## 2023-11-18 LAB
BASOPHILS # BLD AUTO: 0.05 10*3/MM3 (ref 0–0.2)
BASOPHILS NFR BLD AUTO: 0.7 % (ref 0–1.5)
BUN SERPL-MCNC: 20 MG/DL (ref 8–23)
BUN/CREAT SERPL: 20.2 (ref 7–25)
CALCIUM SERPL-MCNC: 10 MG/DL (ref 8.6–10.5)
CHLORIDE SERPL-SCNC: 104 MMOL/L (ref 98–107)
CO2 SERPL-SCNC: 24.5 MMOL/L (ref 22–29)
CREAT SERPL-MCNC: 0.99 MG/DL (ref 0.57–1)
EGFRCR SERPLBLD CKD-EPI 2021: 64.6 ML/MIN/1.73
EOSINOPHIL # BLD AUTO: 0.26 10*3/MM3 (ref 0–0.4)
EOSINOPHIL NFR BLD AUTO: 3.6 % (ref 0.3–6.2)
ERYTHROCYTE [DISTWIDTH] IN BLOOD BY AUTOMATED COUNT: 12.1 % (ref 12.3–15.4)
GLUCOSE SERPL-MCNC: 111 MG/DL (ref 65–99)
HCT VFR BLD AUTO: 43.7 % (ref 34–46.6)
HGB BLD-MCNC: 14.9 G/DL (ref 12–15.9)
IMM GRANULOCYTES # BLD AUTO: 0.03 10*3/MM3 (ref 0–0.05)
IMM GRANULOCYTES NFR BLD AUTO: 0.4 % (ref 0–0.5)
LYMPHOCYTES # BLD AUTO: 2.87 10*3/MM3 (ref 0.7–3.1)
LYMPHOCYTES NFR BLD AUTO: 39.6 % (ref 19.6–45.3)
MCH RBC QN AUTO: 31.4 PG (ref 26.6–33)
MCHC RBC AUTO-ENTMCNC: 34.1 G/DL (ref 31.5–35.7)
MCV RBC AUTO: 92.2 FL (ref 79–97)
MONOCYTES # BLD AUTO: 0.71 10*3/MM3 (ref 0.1–0.9)
MONOCYTES NFR BLD AUTO: 9.8 % (ref 5–12)
NEUTROPHILS # BLD AUTO: 3.33 10*3/MM3 (ref 1.7–7)
NEUTROPHILS NFR BLD AUTO: 45.9 % (ref 42.7–76)
NRBC BLD AUTO-RTO: 0 /100 WBC (ref 0–0.2)
PLATELET # BLD AUTO: 291 10*3/MM3 (ref 140–450)
POTASSIUM SERPL-SCNC: 4.4 MMOL/L (ref 3.5–5.2)
RBC # BLD AUTO: 4.74 10*6/MM3 (ref 3.77–5.28)
SODIUM SERPL-SCNC: 141 MMOL/L (ref 136–145)
WBC # BLD AUTO: 7.25 10*3/MM3 (ref 3.4–10.8)

## 2023-11-21 LAB
C TRACH RRNA SPEC QL NAA+PROBE: NEGATIVE
N GONORRHOEA RRNA SPEC QL NAA+PROBE: NEGATIVE
T VAGINALIS RRNA SPEC QL NAA+PROBE: NEGATIVE

## 2023-11-24 LAB
CONV .: NORMAL
CYTOLOGIST CVX/VAG CYTO: NORMAL
CYTOLOGY CVX/VAG DOC CYTO: NORMAL
CYTOLOGY CVX/VAG DOC THIN PREP: NORMAL
DX ICD CODE: NORMAL
HIV 1 & 2 AB SER-IMP: NORMAL
Lab: NORMAL
OTHER STN SPEC: NORMAL
STAT OF ADQ CVX/VAG CYTO-IMP: NORMAL

## 2024-01-09 ENCOUNTER — TELEPHONE (OUTPATIENT)
Dept: NEUROLOGY | Facility: CLINIC | Age: 64
End: 2024-01-09
Payer: COMMERCIAL

## 2024-01-29 ENCOUNTER — TELEPHONE (OUTPATIENT)
Dept: INTERNAL MEDICINE | Facility: CLINIC | Age: 64
End: 2024-01-29

## 2024-01-29 DIAGNOSIS — R06.89 BREATHING DIFFICULTY: Primary | ICD-10-CM

## 2024-01-29 NOTE — TELEPHONE ENCOUNTER
Caller: Chetna Good    Relationship: Self    Best call back number: [phone]     What is the medical concern/diagnosis: GENERAL LUNG SCREENING     What specialty or service is being requested: PULMONARY FUNCTION TEST    What is the provider, practice or medical service name: ANY

## 2024-01-29 NOTE — TELEPHONE ENCOUNTER
Caller: Chetna Good    Relationship: Self    Best call back number: [phone]     What is the medical concern/diagnosis: NUMBNESS IN TOES    What specialty or service is being requested: EMG    What is the provider, practice or medical service name: DR. JOSE HALLMAN: NEUROLOGY     What is the office location: [address] [place] [address]    What is the office phone number: [phone]    Any additional details: PATIENT STATES SHE HAS NEVER RECEIVED A CALL TO SCHEDULE EMG. PATIENT WOULD LIKE THE REFERRAL PLACED ASAP.

## 2024-01-30 DIAGNOSIS — R20.0 NUMBNESS OF FOOT: Primary | ICD-10-CM

## 2024-01-30 DIAGNOSIS — M21.371 RIGHT FOOT DROP: ICD-10-CM

## 2024-02-09 ENCOUNTER — TELEPHONE (OUTPATIENT)
Dept: NEUROLOGY | Facility: CLINIC | Age: 64
End: 2024-02-09
Payer: COMMERCIAL

## 2024-05-06 ENCOUNTER — PATIENT MESSAGE (OUTPATIENT)
Dept: INTERNAL MEDICINE | Facility: CLINIC | Age: 64
End: 2024-05-06
Payer: COMMERCIAL

## 2024-05-06 DIAGNOSIS — M21.371 RIGHT FOOT DROP: Primary | ICD-10-CM

## 2024-05-14 ENCOUNTER — TELEPHONE (OUTPATIENT)
Dept: INTERNAL MEDICINE | Facility: CLINIC | Age: 64
End: 2024-05-14
Payer: COMMERCIAL

## 2024-05-14 DIAGNOSIS — R20.0 NUMBNESS OF FOOT: Primary | ICD-10-CM

## 2024-05-14 DIAGNOSIS — M21.371 RIGHT FOOT DROP: ICD-10-CM

## 2024-05-14 NOTE — TELEPHONE ENCOUNTER
Pt calling in regards to recent referral placed for EMG & Nerve Conduction test- Pt states the referral needs to be changed to numbing in toes in both feet, not just the right foot- Pt would like to be called and updated when this referral is faxed over to [facility] please.    [facility] fax # [phone]  [facility] phone # [phone]

## 2024-05-20 NOTE — PROGRESS NOTES
"Chief Complaint  Sleep Apnea    Subjective          Chetna Good presents to [facility] NEUROLOGY  History of Present Illness  LUIS F/U ( ONLY)    Sleep testing history:    On NPSG at [facility] , 2/28/24 patient had Moderate obstructive sleep apnea syndrome with apnea-hypopnea index of   24.3 per sleep hour, minimum SpO2 of 71%    PAP download:     Alice Sleepiness Scale:  Sitting and reading 1 WatchingTV 1  Sitting, inactive, in a public place 0  As a passenger in a car for 1 hour w/o a break  0  Lying down to rest in the afternoon  2  Sitting and talking to someone  0  Sitting quietly after a lunch  0  In a car, while stopped for traffic or a light  0  Total 4    She has tried cpap, but did not lke using cpap the pressure was decreased , but ahi  3 to 5,   She used on average about 3-5, she feels she did not feel better,     Has moderate to severe luis,      Continues to co brain fog    Review of Systems   Constitutional:  Positive for activity change.   HENT:  Positive for tinnitus.    Eyes: Negative.    Respiratory: Negative.     Cardiovascular: Negative.    Gastrointestinal: Negative.    Endocrine: Negative.    Genitourinary: Negative.    Musculoskeletal:  Positive for back pain, gait problem, joint swelling, neck pain and neck stiffness.   Neurological:  Negative for dizziness and light-headedness.   Psychiatric/Behavioral:  Negative for agitation and confusion.          Objective   Vital Signs:   /82 (BP Location: Left arm, Patient Position: Sitting, Cuff Size: Adult)   Pulse 85   Ht 180.2 cm (70.95\")   Wt 110 kg (243 lb)   BMI 33.94 kg/m²     Physical Exam  Vitals reviewed.   HENT:      Head: Normocephalic.      Nose: Nose normal.   Eyes:      Pupils: Pupils are equal, round, and reactive to light.   Cardiovascular:      Rate and Rhythm: Normal rate.   Pulmonary:      Effort: Pulmonary effort is normal. No respiratory distress.   Neurological:      General: No " focal deficit present.      Mental Status: She is alert and oriented to person, place, and time.   Psychiatric:         Mood and Affect: Mood normal.        Result Review :                 Assessment and Plan    Diagnoses and all orders for this visit:    1. Obstructive sleep apnea syndrome (Primary)      Pt. does not like CPAP, so will refer for consider INSPIRE,    Try CPAP, download in about one month, adjust pressure , if not tolerating may try BiPAP          Follow Up   Return in about 4 months (around 9/21/2024).    Patient was given instructions and counseling regarding her condition or for health maintenance advice. Please see specific information pulled into the AVS if appropriate.       This document has been electronically signed by Joseph Seipel, MD on May 21, 2024 09:20 EDT

## 2024-05-21 ENCOUNTER — OFFICE VISIT (OUTPATIENT)
Dept: NEUROLOGY | Facility: CLINIC | Age: 64
End: 2024-05-21
Payer: COMMERCIAL

## 2024-05-21 VITALS
HEART RATE: 85 BPM | SYSTOLIC BLOOD PRESSURE: 130 MMHG | WEIGHT: 243 LBS | DIASTOLIC BLOOD PRESSURE: 82 MMHG | HEIGHT: 71 IN | BODY MASS INDEX: 34.02 KG/M2

## 2024-05-21 DIAGNOSIS — G47.33 OBSTRUCTIVE SLEEP APNEA SYNDROME: Primary | ICD-10-CM

## 2024-05-21 PROCEDURE — 99213 OFFICE O/P EST LOW 20 MIN: CPT | Performed by: PSYCHIATRY & NEUROLOGY

## 2024-05-21 RX ORDER — TROSPIUM CHLORIDE 20 MG/1
TABLET, FILM COATED ORAL
COMMUNITY
Start: 2024-02-03

## 2024-05-21 RX ORDER — MELOXICAM 7.5 MG/1
TABLET ORAL
COMMUNITY
Start: 2024-03-11

## 2024-05-22 ENCOUNTER — TELEPHONE (OUTPATIENT)
Dept: NEUROLOGY | Facility: CLINIC | Age: 64
End: 2024-05-22
Payer: COMMERCIAL

## 2024-06-13 ENCOUNTER — OFFICE VISIT (OUTPATIENT)
Dept: INTERNAL MEDICINE | Facility: CLINIC | Age: 64
End: 2024-06-13
Payer: COMMERCIAL

## 2024-06-13 VITALS
SYSTOLIC BLOOD PRESSURE: 126 MMHG | WEIGHT: 253.6 LBS | BODY MASS INDEX: 35.5 KG/M2 | HEART RATE: 62 BPM | OXYGEN SATURATION: 97 % | DIASTOLIC BLOOD PRESSURE: 79 MMHG | HEIGHT: 71 IN

## 2024-06-13 DIAGNOSIS — Z00.00 ANNUAL PHYSICAL EXAM: Primary | ICD-10-CM

## 2024-06-13 DIAGNOSIS — Z11.59 ENCOUNTER FOR HEPATITIS C SCREENING TEST FOR LOW RISK PATIENT: ICD-10-CM

## 2024-06-13 DIAGNOSIS — R41.89 BRAIN FOG: ICD-10-CM

## 2024-06-13 DIAGNOSIS — Z12.11 SCREENING FOR COLON CANCER: ICD-10-CM

## 2024-06-13 LAB — HBA1C MFR BLD: 5.8 % (ref 4.8–5.6)

## 2024-06-13 PROCEDURE — 90471 IMMUNIZATION ADMIN: CPT | Performed by: STUDENT IN AN ORGANIZED HEALTH CARE EDUCATION/TRAINING PROGRAM

## 2024-06-13 PROCEDURE — 90715 TDAP VACCINE 7 YRS/> IM: CPT | Performed by: STUDENT IN AN ORGANIZED HEALTH CARE EDUCATION/TRAINING PROGRAM

## 2024-06-13 PROCEDURE — 99396 PREV VISIT EST AGE 40-64: CPT | Performed by: STUDENT IN AN ORGANIZED HEALTH CARE EDUCATION/TRAINING PROGRAM

## 2024-06-13 NOTE — PROGRESS NOTES
Steven Willson M.D.  Internal Medicine  [facility]  [address], [address]  [place] [address]  [phone]      Chief Complaint  Annual Exam    SUBJECTIVE    History of Present Illness    Chetna Good is a 63 y.o. female with obesity and LUIS who presents to the office today as an established patient that last saw me on 11/17/2023.     At last appointment she had many concerns     Concerned about multiple sebhorreic keratosis.  Has not seen dermatology. Rash on face. Putting alcohol and witch hazel on it. Pill in past helped.      For her foot drop and toe numbness she wants to  wait on EMG and NCS until she sees neurology. I offered PT referral for foot drop which she declined.  Frustrated with brain fog. She had sleep study which showed sleep apnea. Thinks brain fog was from COVID. Thinks toe numbness is from COVID. Toe worse if moving it. Ankle on left is swollen. .        Working on walking for weight loss.     Feels depressed because of brain fog. Decreased concentration. Denies anhedonia. In pain nonstop. Denies anxiety. Lots of stress. No guilt. Moving slower than usual. Feels depressed with weight. States she does not each much. Drinks beer on weekends. Seen dietician and done weight watchers in the past. She si stress eater.     Seeing ortho for foot pain.     Review of Systems    No Known Allergies     Outpatient Medications Marked as Taking for the 6/13/24 encounter (Office Visit) with Steven Willson MD   Medication Sig Dispense Refill    APPLE CIDER VINEGAR PO Take  by mouth.      BLACK CURRANT SEED OIL PO Take  by mouth.      CINNAMON PO Take 1,000 mg by mouth.      Cyanocobalamin (B-12) 5000 MCG capsule Take  by mouth.      MAGNESIUM PO Take  by mouth.      Phendimetrazine Tartrate 35 MG tablet Take  by mouth.      trospium (SANCTURA) 20 MG tablet       Turmeric 500 MG capsule Take  by mouth.      VITAMIN D PO Take  by mouth.          Past Medical History:   Diagnosis Date  "   Bell palsy     Difficulty walking     Memory loss     Shingles      Past Surgical History:   Procedure Laterality Date    BREAST SURGERY       SECTION      HERNIA REPAIR       Family History   Problem Relation Age of Onset    Breast cancer Mother     Arrhythmia Mother     Heart disease Father     Kidney cancer Sister         [facility]    reports that she quit smoking about 36 years ago. Her smoking use included cigarettes. She started smoking about 24 years ago. She has a 12.2 pack-year smoking history. She has never used smokeless tobacco. She reports that she does not currently use alcohol. She reports that she does not use drugs.    OBJECTIVE    Vital Signs:   /79   Pulse 62   Ht 180.2 cm (70.95\")   Wt 115 kg (253 lb 9.6 oz)   SpO2 97%   BMI 35.42 kg/m²     Physical Exam  Constitutional:       Appearance: Normal appearance. She is normal weight.   Cardiovascular:      Rate and Rhythm: Normal rate and regular rhythm.      Heart sounds: Normal heart sounds. No murmur heard.  Pulmonary:      Effort: Pulmonary effort is normal.      Breath sounds: Normal breath sounds.   Abdominal:      General: Abdomen is flat. There is no distension.      Palpations: Abdomen is soft.      Tenderness: There is no abdominal tenderness.   Skin:     General: Skin is warm and dry.   Neurological:      Mental Status: She is alert.   Psychiatric:         Mood and Affect: Mood normal.         Behavior: Behavior normal.         Thought Content: Thought content normal.            The following data was reviewed by: Steven Willson MD on 2024:  CMP          2023    14:45 2024    10:07   CMP   Glucose 111  104    BUN 20  24    Creatinine 0.99  1.10    Sodium 141  138    Potassium 4.4  4.7    Chloride 104  104    Calcium 10.0  9.8    Total Protein  7.0    Albumin  4.4    Globulin  2.6    Total Bilirubin  0.5    Alkaline Phosphatase  79    AST (SGOT)  27    ALT (SGPT)  29    BUN/Creatinine Ratio 20.2  22      CBC " w/diff          11/17/2023    14:45   CBC w/Diff   WBC 7.25    RBC 4.74    Hemoglobin 14.9    Hematocrit 43.7    MCV 92.2    MCH 31.4    MCHC 34.1    RDW 12.1    Platelets 291    Neutrophil Rel % 45.9    Lymphocyte Rel % 39.6    Monocyte Rel % 9.8    Eosinophil Rel % 3.6    Basophil Rel % 0.7      Lipid Panel          6/13/2024    10:07   Lipid Panel   Total Cholesterol 190    Triglycerides 120    HDL Cholesterol 56    VLDL Cholesterol 21    LDL Cholesterol  113        A1C Last 3 Results          6/13/2024    10:08   HGBA1C Last 3 Results   Hemoglobin A1C 5.80      Data reviewed : sleep medicine notes              ASSESSMENT & PLAN     Diagnoses and all orders for this visit:    1. Annual physical exam (Primary)  -     Lipid Panel  -     Urinalysis With Microscopic - Urine, Clean Catch  -     Comprehensive Metabolic Panel  -     CBC & Differential  -     Hemoglobin A1c    2. Screening for colon cancer  -     Ambulatory Referral For Screening Colonoscopy    3. Encounter for hepatitis C screening test for low risk patient  -     HCV Antibody Rfx To Qnt PCR    4. Brain fog    Other orders  -     Tdap Vaccine Greater Than or Equal To 8yo IM  -     Microscopic Examination -  -     Interpretation:        Use CPAP regulary. Consider depression treatment. Declines since she thinks Brain fog is from COVID. Discussed there is not test for COVID brain fog and treatments are unproven.    #Annual Preventative Health Examination   -Age and sex appropriate physical exam performed and documented. Updated past medical, family, social and surgical histories as well as allergies and care team list. Addressed care gaps listed in the medical record.  -Encouraged minimum of 30 minutes or more of exercise at a brisk walk or higher 5 days per week combined with a well-balanced diet.  -Immunizations reviewed and updated in EMR.  -Advised that all women who are planning or capable of pregnancy take a daily supplement containing 0.4 to 0.8 mg  (400 to 800 ?g) of folic acid.  The 10-year ASCVD risk score (Chuy DORSEY, et al., 2019) is: 3.8%    Values used to calculate the score:      Age: 63 years      Sex: Female      Is Non- : No      Diabetic: No      Tobacco smoker: No      Systolic Blood Pressure: 126 mmHg      Is BP treated: No      HDL Cholesterol: 56 mg/dL      Total Cholesterol: 190 mg/dL   -Lipid screening:   Lipid Panel          6/13/2024    10:07   Lipid Panel   Total Cholesterol 190    Triglycerides 120    HDL Cholesterol 56    VLDL Cholesterol 21    LDL Cholesterol  113     Will screen for hyperlipidemia today and calculate ASCVD risk if appropriate.    -Aspirin for primary or secondary prevention: Not applicable, patient is greater than age 60 and risks outweigh benefits for primary prevention.  -Depression screening: PHQ2 performed and the patient's screen was positive.  -Diabetes screening:  Patient is 35-70 years of age and overweight, screening for diabetes is indicated every 3 years. Screening is not up to date and will be updated today.   --Hypertension screening: Patient screened negative for HTN today.  --Colon cancer screening: Patient is age 45-75 and I discussed the importance of colon cancer screening in order to detect cancerous or pre-cancerous lesions early in an effort to limit severity of cancer and possibly death if it is present. I informed the patient that screening for colon cancer has risks but these risks are small and include risks associated with a colonoscopy such as bleeding, pain or perforation, or the potential to need additional follow up tests or surgeries depending on the results. Discussed the options of colon cancer screening (i.e., colonoscopy vs Cologuard) and the benefits and disadvantages of each. Patient accepted screening.   -Lung cancer screening: Patient has smoked but does not meet other eligibility criteria for screening.  -Cervical cancer screening:  Informed patient that the  USPSTF recommends screening for cervical cancer every 3 years with cervical cytology alone in women aged 21 to 29 years. For women aged 30 to 65 years, the USPSTF recommends screening every 3 years with cervical cytology alone, every 5 years with high-risk human papillomavirus (hrHPV) testing alone, or every 5 years with HPV testing in combination with cytology (cotesting). Lasp pap : was normal   -Breast cancer screening: Informed patient that the USPSTF recommends biennial screening mammography for women aged 50 to 74 years. was done on approximately 11/23 and the result was: Birads I (Normal).      Health Maintenance Due   Topic Date Due    COLORECTAL CANCER SCREENING  Never done    RSV Vaccine - Adults (1 - 1-dose 60+ series) Never done    ANNUAL PHYSICAL  Never done        Follow Up  Return in about 3 months (around 9/13/2024) for Recheck.    Patient/family had no further questions at this time and verbalized understanding of the plan discussed today.

## 2024-06-14 LAB
ALBUMIN SERPL-MCNC: 4.4 G/DL (ref 3.9–4.9)
ALBUMIN/GLOB SERPL: 1.7 {RATIO}
ALP SERPL-CCNC: 79 IU/L (ref 44–121)
ALT SERPL-CCNC: 29 IU/L (ref 0–32)
APPEARANCE UR: CLEAR
AST SERPL-CCNC: 27 IU/L (ref 0–40)
BACTERIA #/AREA URNS HPF: NORMAL /[HPF]
BASOPHILS # BLD AUTO: 0.1 X10E3/UL (ref 0–0.2)
BASOPHILS NFR BLD AUTO: 1 %
BILIRUB SERPL-MCNC: 0.5 MG/DL (ref 0–1.2)
BILIRUB UR QL STRIP: NEGATIVE
BUN SERPL-MCNC: 24 MG/DL (ref 8–27)
BUN/CREAT SERPL: 22 (ref 12–28)
CALCIUM SERPL-MCNC: 9.8 MG/DL (ref 8.7–10.3)
CASTS URNS QL MICRO: NORMAL /LPF
CHLORIDE SERPL-SCNC: 104 MMOL/L (ref 96–106)
CHOLEST SERPL-MCNC: 190 MG/DL (ref 100–199)
CO2 SERPL-SCNC: 21 MMOL/L (ref 20–29)
COLOR UR: YELLOW
CREAT SERPL-MCNC: 1.1 MG/DL (ref 0.57–1)
EGFRCR SERPLBLD CKD-EPI 2021: 57 ML/MIN/1.73
EOSINOPHIL # BLD AUTO: 0.2 X10E3/UL (ref 0–0.4)
EOSINOPHIL NFR BLD AUTO: 3 %
EPI CELLS #/AREA URNS HPF: NORMAL /HPF (ref 0–10)
ERYTHROCYTE [DISTWIDTH] IN BLOOD BY AUTOMATED COUNT: 12 % (ref 11.7–15.4)
GLOBULIN SER CALC-MCNC: 2.6 G/DL (ref 1.5–4.5)
GLUCOSE SERPL-MCNC: 104 MG/DL (ref 70–99)
GLUCOSE UR QL STRIP: NEGATIVE
HCT VFR BLD AUTO: 44.1 % (ref 34–46.6)
HCV AB SERPL QL IA: NORMAL
HCV IGG SERPL QL IA: NON REACTIVE
HDLC SERPL-MCNC: 56 MG/DL
HGB BLD-MCNC: 15.3 G/DL (ref 11.1–15.9)
HGB UR QL STRIP: NEGATIVE
IMM GRANULOCYTES # BLD AUTO: 0 X10E3/UL (ref 0–0.1)
IMM GRANULOCYTES NFR BLD AUTO: 0 %
KETONES UR QL STRIP: NEGATIVE
LDLC SERPL CALC-MCNC: 113 MG/DL (ref 0–99)
LEUKOCYTE ESTERASE UR QL STRIP: NEGATIVE
LYMPHOCYTES # BLD AUTO: 2.1 X10E3/UL (ref 0.7–3.1)
LYMPHOCYTES NFR BLD AUTO: 32 %
MCH RBC QN AUTO: 31.8 PG (ref 26.6–33)
MCHC RBC AUTO-ENTMCNC: 34.7 G/DL (ref 31.5–35.7)
MCV RBC AUTO: 92 FL (ref 79–97)
MICRO URNS: NORMAL
MICRO URNS: NORMAL
MONOCYTES # BLD AUTO: 0.6 X10E3/UL (ref 0.1–0.9)
MONOCYTES NFR BLD AUTO: 10 %
NEUTROPHILS # BLD AUTO: 3.5 X10E3/UL (ref 1.4–7)
NEUTROPHILS NFR BLD AUTO: 54 %
NITRITE UR QL STRIP: NEGATIVE
PH UR STRIP: 6 [PH] (ref 5–7.5)
PLATELET # BLD AUTO: 277 X10E3/UL (ref 150–450)
POTASSIUM SERPL-SCNC: 4.7 MMOL/L (ref 3.5–5.2)
PROT SERPL-MCNC: 7 G/DL (ref 6–8.5)
PROT UR QL STRIP: NEGATIVE
RBC # BLD AUTO: 4.81 X10E6/UL (ref 3.77–5.28)
RBC #/AREA URNS HPF: NORMAL /HPF (ref 0–2)
SODIUM SERPL-SCNC: 138 MMOL/L (ref 134–144)
SP GR UR STRIP: 1.02 (ref 1–1.03)
TRIGL SERPL-MCNC: 120 MG/DL (ref 0–149)
UROBILINOGEN UR STRIP-MCNC: 0.2 MG/DL (ref 0.2–1)
VLDLC SERPL CALC-MCNC: 21 MG/DL (ref 5–40)
WBC # BLD AUTO: 6.4 X10E3/UL (ref 3.4–10.8)
WBC #/AREA URNS HPF: NORMAL /HPF (ref 0–5)

## 2024-06-18 ENCOUNTER — PATIENT MESSAGE (OUTPATIENT)
Dept: INTERNAL MEDICINE | Facility: CLINIC | Age: 64
End: 2024-06-18
Payer: COMMERCIAL

## 2024-06-18 DIAGNOSIS — M21.371 RIGHT FOOT DROP: Primary | ICD-10-CM

## 2024-06-18 DIAGNOSIS — R20.0 NUMBNESS OF FOOT: ICD-10-CM

## 2024-06-18 DIAGNOSIS — R94.131 ABNORMAL EMG: ICD-10-CM

## 2024-07-10 ENCOUNTER — TELEPHONE (OUTPATIENT)
Dept: INTERNAL MEDICINE | Facility: CLINIC | Age: 64
End: 2024-07-10
Payer: COMMERCIAL

## 2024-07-10 DIAGNOSIS — R20.0 NUMBNESS OF FOOT: Primary | ICD-10-CM

## 2024-07-10 DIAGNOSIS — R94.131 ABNORMAL EMG: ICD-10-CM

## 2024-07-10 NOTE — TELEPHONE ENCOUNTER
Caller: Chetna Good    Relationship to patient: Self    Best call back number: [phone]    Patient is needing: PATIENT IS WANTING HER REFERRAL FOR  MRI LUMBAR SPINE WO CONTRAST  SWITCHED TO A DIFFERENT FACILITY. PATIENT IS REQUESTING IF HER DEMOGRAPHICS AND INSURANCE CARDS CAN BE FAXED OVER AS WELL.    [facility]  Address: [address] [place] [address]  Phone: [phone]  FAX NUMBER: [phone]

## 2024-11-20 NOTE — PROGRESS NOTES
Chief Complaint  Sleep Apnea    Subjective          Chetna Good presents to [facility] NEUROLOGY  History of Present Illness    Chetna Good is a 64-year-old female seen today in follow-up for LUIS and PAP management.  She last saw Dr. Seipel 5/21/2024.  She was diagnosed with sleep apnea in the past and tried CPAP, but did not tolerate it.  A repeat sleep study done 2/28/2024 showed moderate LUIS with an overall AHI of 24.3 and a minimum desaturation of 71%.  The patient was interested in inspire.    Patient reports that she does not sleep a lot of hours.  She will often take her mask off if she wakes up in the middle of the night.      Her primary complaint is brain fog.  She feels like this started immediately after having COVID infection.  She also notes having shortness of breath during the day and describes it as a sensation of being unable to take a deep breath.  She is working with her provider to have pulmonary function testing done in the near future.        Sleep testing history:    On NPSG/home sleep test at [facility]  , 2-28-24 patient had moderate to severe obstructive sleep apnea syndrome with apnea-hypopnea index of 24.3 per sleep hour, minimum SpO2 of 71%    PAP download (care  5/25/2024 - 11/20/2024):  The patient is on CPAP therapy at 5-15 cm/H2O.   Data indicates Minimal compliance. With 46.1% usage for more than 4 hours with an average usage of 3 hours 31 minutes. AHI down to 2.5 .  Average pressures 7 centimeters H2O.  Average time in large leak 4 seconds.     The patient's hypersomnia has stayed the same       Guys Sleepiness Scale:  Sitting and reading JS Guys Sleepiness: 2 WatchingTV JS Guys Sleepiness: 2  Sitting, inactive, in a public place JS Guys Sleepiness: 0  As a passenger in a car for 1 hour w/o a break  JS Guys Sleepiness: 1  Lying down to rest in the afternoon JS Guys Sleepiness: 2  Sitting and talking to  "someone  JS [place] Sleepiness: 0  Sitting quietly after a lunch  JS Imn[place] Sleepiness: 0  In a car, while stopped for traffic or a light  JS [place] Sleepiness: 0  Total 7      Review of Systems   Constitutional:  Positive for fatigue.   Respiratory:  Positive for apnea.    All other systems reviewed and are negative.     Objective   Vital Signs:   /77   Pulse 74   Ht 180.2 cm (70.95\")   Wt 118 kg (260 lb)   BMI 36.31 kg/m²     Physical Exam  Vitals reviewed.   Constitutional:       Appearance: She is well-developed. She is obese.   HENT:      Head: Normocephalic and atraumatic.   Eyes:      Extraocular Movements: Extraocular movements intact.      Pupils: Pupils are equal, round, and reactive to light.   Cardiovascular:      Rate and Rhythm: Normal rate.      Heart sounds: No murmur heard.  Pulmonary:      Effort: Pulmonary effort is normal.   Musculoskeletal:      Cervical back: Normal range of motion and neck supple.   Skin:     General: Skin is warm and dry.   Neurological:      Mental Status: She is alert and oriented to person, place, and time.      Cranial Nerves: Cranial nerves 2-12 are intact. No cranial nerve deficit.      Motor: Motor function is intact. No tremor.      Coordination: Finger-Nose-Finger Test normal. Rapid alternating movements normal.      Gait: Gait is intact.   Psychiatric:         Attention and Perception: Attention normal.         Mood and Affect: Mood normal.         Speech: Speech normal.         Behavior: Behavior normal.         Cognition and Memory: Cognition and memory normal.         Judgment: Judgment normal.        Result Review :                 Assessment and Plan    Diagnoses and all orders for this visit:    1. LUIS (obstructive sleep apnea) (Primary)  -     Ambulatory Referral to ENT (Otolaryngology)    Chetna Good is seen today in follow-up for LUIS and CPAP management.  The patient has a primary complaint of brain fog that has been unchanged since starting " PAP therapy.  She had a sleep study done in May 2023 which showed an overall AHI of 39.9 and a repeat HST done February 2024 which showed an overall AHI of 24.3.  She is currently using a DreamStation auto CPAP 5-15 cm H2O.  She has used the device 76% of the time over the last 6 months, but 46.1% of the time over 4 hours.    She is struggling to meet compliance with PAP therapy and still has issues with brain fog.  We had a long discussion about inspire and alternative treatments for LUIS including hyoid lift.  Will [facility] the patient [facility]    Though inspire is typically safer people with a BMI less than 40, I would like for her to continue focusing on weight loss.  This may help energy levels and sleep apnea.    She feels like the air pressure is too high so we can reduce her pressure from 5-15 down to 5-10 cm H2O.  I will do this remotely if I am able.    Otherwise she will follow-up in 6 months or sooner if needed.  She is encouraged to contact the office in the meantime with questions or concerns.    The patient is compliant with and benefiting from PAP therapy.    I spent 46 minutes caring for Chetna on this date of service. This time includes time spent by me in the following activities:preparing for the visit, reviewing tests, performing a medically appropriate examination and/or evaluation , counseling and educating the patient/family/caregiver, referring and communicating with other health care professionals , and documenting information in the medical record  Follow Up   Return in about 6 months (around 5/21/2025).    Patient was given instructions and counseling regarding her condition or for health maintenance advice. Please see specific information pulled into the AVS if appropriate.       This document has been electronically signed by ZAHRA Garrison on November 21, 2024 11:48 EST

## 2024-11-21 ENCOUNTER — OFFICE VISIT (OUTPATIENT)
Dept: NEUROLOGY | Facility: CLINIC | Age: 64
End: 2024-11-21
Payer: COMMERCIAL

## 2024-11-21 VITALS
WEIGHT: 260 LBS | HEIGHT: 71 IN | BODY MASS INDEX: 36.4 KG/M2 | SYSTOLIC BLOOD PRESSURE: 147 MMHG | DIASTOLIC BLOOD PRESSURE: 77 MMHG | HEART RATE: 74 BPM

## 2024-11-21 DIAGNOSIS — G47.33 OSA (OBSTRUCTIVE SLEEP APNEA): Primary | ICD-10-CM

## 2024-11-21 DIAGNOSIS — E66.9 OBESITY (BMI 35.0-39.9 WITHOUT COMORBIDITY): ICD-10-CM

## 2024-11-21 DIAGNOSIS — R41.89 BRAIN FOG: ICD-10-CM

## 2025-01-29 VITALS
SYSTOLIC BLOOD PRESSURE: 142 MMHG | DIASTOLIC BLOOD PRESSURE: 112 MMHG | OXYGEN SATURATION: 96 % | HEART RATE: 62 BPM | HEART RATE: 68 BPM | HEART RATE: 54 BPM | RESPIRATION RATE: 11 BRPM | DIASTOLIC BLOOD PRESSURE: 104 MMHG | DIASTOLIC BLOOD PRESSURE: 69 MMHG | HEART RATE: 73 BPM | HEART RATE: 83 BPM | OXYGEN SATURATION: 94 % | SYSTOLIC BLOOD PRESSURE: 115 MMHG | SYSTOLIC BLOOD PRESSURE: 143 MMHG | RESPIRATION RATE: 17 BRPM | RESPIRATION RATE: 22 BRPM | RESPIRATION RATE: 23 BRPM | TEMPERATURE: 97 F | RESPIRATION RATE: 20 BRPM | DIASTOLIC BLOOD PRESSURE: 79 MMHG | DIASTOLIC BLOOD PRESSURE: 93 MMHG | RESPIRATION RATE: 16 BRPM | RESPIRATION RATE: 12 BRPM | TEMPERATURE: 96.6 F | DIASTOLIC BLOOD PRESSURE: 114 MMHG | RESPIRATION RATE: 18 BRPM | WEIGHT: 255 LBS | HEART RATE: 74 BPM | OXYGEN SATURATION: 98 % | SYSTOLIC BLOOD PRESSURE: 159 MMHG | SYSTOLIC BLOOD PRESSURE: 122 MMHG | DIASTOLIC BLOOD PRESSURE: 94 MMHG | HEART RATE: 66 BPM | OXYGEN SATURATION: 95 % | DIASTOLIC BLOOD PRESSURE: 65 MMHG | HEIGHT: 71 IN | DIASTOLIC BLOOD PRESSURE: 73 MMHG | HEART RATE: 61 BPM | OXYGEN SATURATION: 92 % | SYSTOLIC BLOOD PRESSURE: 135 MMHG | SYSTOLIC BLOOD PRESSURE: 128 MMHG | DIASTOLIC BLOOD PRESSURE: 81 MMHG | SYSTOLIC BLOOD PRESSURE: 120 MMHG | SYSTOLIC BLOOD PRESSURE: 112 MMHG | OXYGEN SATURATION: 97 % | SYSTOLIC BLOOD PRESSURE: 165 MMHG | RESPIRATION RATE: 10 BRPM | SYSTOLIC BLOOD PRESSURE: 152 MMHG

## 2025-01-30 ENCOUNTER — OFFICE (AMBULATORY)
Dept: URBAN - METROPOLITAN AREA PATHOLOGY 4 | Facility: PATHOLOGY | Age: 65
End: 2025-01-30
Payer: COMMERCIAL

## 2025-01-30 ENCOUNTER — AMBULATORY SURGICAL CENTER (AMBULATORY)
Dept: URBAN - METROPOLITAN AREA SURGERY 17 | Facility: SURGERY | Age: 65
End: 2025-01-30
Payer: COMMERCIAL

## 2025-01-30 DIAGNOSIS — D12.2 BENIGN NEOPLASM OF ASCENDING COLON: ICD-10-CM

## 2025-01-30 DIAGNOSIS — K57.30 DIVERTICULOSIS OF LARGE INTESTINE WITHOUT PERFORATION OR ABS: ICD-10-CM

## 2025-01-30 DIAGNOSIS — Z12.11 ENCOUNTER FOR SCREENING FOR MALIGNANT NEOPLASM OF COLON: ICD-10-CM

## 2025-01-30 PROBLEM — K63.5 POLYP OF COLON: Status: ACTIVE | Noted: 2025-01-30

## 2025-01-30 LAB
GI HISTOLOGY: A. ASCENDING COLON/POLYP: (no result)
GI HISTOLOGY: PDF REPORT: (no result)

## 2025-01-30 PROCEDURE — 88305 TISSUE EXAM BY PATHOLOGIST: CPT | Performed by: PATHOLOGY

## 2025-01-30 PROCEDURE — 45385 COLONOSCOPY W/LESION REMOVAL: CPT | Mod: 33 | Performed by: INTERNAL MEDICINE

## 2025-01-30 RX ADMIN — ONDANSETRON HYDROCHLORIDE 4 MG: 4 SOLUTION ORAL at 08:55

## 2025-04-03 ENCOUNTER — OFFICE VISIT (OUTPATIENT)
Dept: NEUROLOGY | Facility: CLINIC | Age: 65
End: 2025-04-03
Payer: COMMERCIAL

## 2025-04-03 VITALS
WEIGHT: 260.2 LBS | BODY MASS INDEX: 36.29 KG/M2 | OXYGEN SATURATION: 95 % | HEART RATE: 89 BPM | DIASTOLIC BLOOD PRESSURE: 98 MMHG | SYSTOLIC BLOOD PRESSURE: 140 MMHG

## 2025-04-03 DIAGNOSIS — R41.89 BRAIN FOG: Primary | ICD-10-CM

## 2025-04-03 DIAGNOSIS — G47.33 OSA (OBSTRUCTIVE SLEEP APNEA): ICD-10-CM

## 2025-04-03 PROCEDURE — 99214 OFFICE O/P EST MOD 30 MIN: CPT | Performed by: PSYCHIATRY & NEUROLOGY

## 2025-04-03 NOTE — PROGRESS NOTES
"Notes by [facility]:  Ms Good is here today for a follow up for \"brain fog\". She states that she is still having concerns.    Subjective:     Patient ID: Chetna Good is a 64 y.o. female.    History of Present Illness  The following portions of the patient's history were reviewed and updated as appropriate: allergies, current medications, past family history, past medical history, past social history, past surgical history, and problem list.    Review of Systems   Psychiatric/Behavioral:  Positive for confusion and decreased concentration.         Objective:    Neurological Exam   Awake alert pleasant cooperative with fluent speech and normal speech comprehension.       Cranial nerves II through XII normal and symmetric.     The motor exam reveals normal and symmetric tone bulk and power with no drift and no spasticity.     Sensory exam reveals symmetric sensation to light touch, temperature, vibration.     Tendon reflexes are 1+ and symmetric throughout including ankle jerks.  Toes are downgoing.  No ankle clonus.     Walks in a narrow base with good stride length.  No parkinsonian features.  Physical Exam    Assessment/Plan:     Diagnoses and all orders for this visit:    1. Brain fog (Primary)    2. LUIS (obstructive sleep apnea)     COVID related brain fog, exacerbated by obstructive sleep apnea, which she is still working on getting titrated correctly.  I have encouraged her to continue to work on this.  The rest of her laboratory and imaging workup for other modifiable causes is negative.  No further intervention necessary currently from a neurological perspective.  Happy to see her back as needed.            "